# Patient Record
Sex: MALE | Race: WHITE | NOT HISPANIC OR LATINO | Employment: FULL TIME | URBAN - METROPOLITAN AREA
[De-identification: names, ages, dates, MRNs, and addresses within clinical notes are randomized per-mention and may not be internally consistent; named-entity substitution may affect disease eponyms.]

---

## 2019-06-15 ENCOUNTER — OFFICE VISIT (OUTPATIENT)
Dept: URGENT CARE | Facility: CLINIC | Age: 44
End: 2019-06-15
Payer: COMMERCIAL

## 2019-06-15 VITALS
OXYGEN SATURATION: 98 % | RESPIRATION RATE: 16 BRPM | TEMPERATURE: 98.7 F | HEIGHT: 69 IN | HEART RATE: 72 BPM | WEIGHT: 174 LBS | DIASTOLIC BLOOD PRESSURE: 78 MMHG | BODY MASS INDEX: 25.77 KG/M2 | SYSTOLIC BLOOD PRESSURE: 136 MMHG

## 2019-06-15 DIAGNOSIS — Z23 NEED FOR VACCINATION: ICD-10-CM

## 2019-06-15 DIAGNOSIS — S61.219A LACERATION WITHOUT FOREIGN BODY OF UNSPECIFIED FINGER WITHOUT DAMAGE TO NAIL, INITIAL ENCOUNTER: Primary | ICD-10-CM

## 2019-06-15 PROCEDURE — 12001 RPR S/N/AX/GEN/TRNK 2.5CM/<: CPT | Performed by: FAMILY MEDICINE

## 2019-06-15 PROCEDURE — 90715 TDAP VACCINE 7 YRS/> IM: CPT

## 2019-06-15 PROCEDURE — 99204 OFFICE O/P NEW MOD 45 MIN: CPT | Performed by: FAMILY MEDICINE

## 2019-06-15 RX ORDER — FLUTICASONE PROPIONATE 50 MCG
1 SPRAY, SUSPENSION (ML) NASAL DAILY
COMMUNITY

## 2020-09-21 ENCOUNTER — NURSE TRIAGE (OUTPATIENT)
Dept: OTHER | Facility: OTHER | Age: 45
End: 2020-09-21

## 2020-09-21 DIAGNOSIS — Z11.59 ENCOUNTER FOR SCREENING FOR OTHER VIRAL DISEASES: Primary | ICD-10-CM

## 2020-09-21 DIAGNOSIS — Z11.59 ENCOUNTER FOR SCREENING FOR OTHER VIRAL DISEASES: ICD-10-CM

## 2020-09-21 PROCEDURE — U0003 INFECTIOUS AGENT DETECTION BY NUCLEIC ACID (DNA OR RNA); SEVERE ACUTE RESPIRATORY SYNDROME CORONAVIRUS 2 (SARS-COV-2) (CORONAVIRUS DISEASE [COVID-19]), AMPLIFIED PROBE TECHNIQUE, MAKING USE OF HIGH THROUGHPUT TECHNOLOGIES AS DESCRIBED BY CMS-2020-01-R: HCPCS | Performed by: FAMILY MEDICINE

## 2020-09-21 NOTE — TELEPHONE ENCOUNTER
Regarding: COVID TEST NO SYMPTOMS 2 OF 3  ----- Message from Fariha Box sent at 9/21/2020  8:46 AM EDT -----  " My  was exposed to covid"

## 2020-09-21 NOTE — TELEPHONE ENCOUNTER
Reason for Disposition   [1] COVID-19 EXPOSURE (Close Contact) AND [2] within last 14 days BUT [2] NO symptoms    Answer Assessment - Initial Assessment Questions  1  CLOSE CONTACT: "Who is the person with the confirmed or suspected COVID-19 infection that you were exposed to?"      Friend of the family     2  PLACE of CONTACT: "Where were you when you were exposed to COVID-19?" (e g , home, school, medical waiting room; which city?)      Out door party     3  TYPE of CONTACT: "How much contact was there?" (e g , sitting next to, live in same house, work in same office, same building)      In the same house and sitting next to     4  DURATION of CONTACT: "How long were you in contact with the COVID-19 patient?" (e g , a few seconds, passed by person, a few minutes, live with the patient)      1 day     5  DATE of CONTACT: "When did you have contact with a COVID-19 patient?" (e g , how many days ago)      9/12/2020    6  TRAVEL: "Have you traveled out of the country recently?" If so, "When and where?"      * Also ask about out-of-state travel, since the CDC has identified some high risk cities for community spread in the [address] [place] [address] [address]  * Note: Travel becomes less relevant if there is widespread community transmission where the patient lives  Denies     7  COMMUNITY SPREAD: "Are there lots of cases of COVID-19 (community spread) where you live?" (See public health department website, if unsure)    * MAJOR community spread: high number of cases; numbers of cases are increasing; many people hospitalized  * MINOR community spread: low number of cases; not increasing; few or no people hospitalized      [facility]     8  SYMPTOMS: "Do you have any symptoms?" (e g , fever, cough, breathing difficulty)      Denies      10  HIGH RISK: "Do you have any heart or lung problems?  Do you have a weak immune system?" (e g , CHF, COPD, asthma, HIV positive, chemotherapy, renal failure, diabetes mellitus, sickle cell anemia) Denies    Protocols used: CORONAVIRUS (COVID-19) EXPOSURE-ADULT-[place]

## 2020-09-23 LAB — SARS-COV-2 RNA SPEC QL NAA+PROBE: NOT DETECTED

## 2021-04-13 ENCOUNTER — OFFICE VISIT (OUTPATIENT)
Dept: URGENT CARE | Facility: CLINIC | Age: 46
End: 2021-04-13
Payer: COMMERCIAL

## 2021-04-13 VITALS
OXYGEN SATURATION: 100 % | RESPIRATION RATE: 16 BRPM | BODY MASS INDEX: 23.91 KG/M2 | HEART RATE: 68 BPM | HEIGHT: 70 IN | WEIGHT: 167 LBS | TEMPERATURE: 98.4 F

## 2021-04-13 DIAGNOSIS — J06.9 VIRAL URI: Primary | ICD-10-CM

## 2021-04-13 LAB — S PYO AG THROAT QL: NEGATIVE

## 2021-04-13 PROCEDURE — 87880 STREP A ASSAY W/OPTIC: CPT | Performed by: PHYSICIAN ASSISTANT

## 2021-04-13 PROCEDURE — U0005 INFEC AGEN DETEC AMPLI PROBE: HCPCS | Performed by: PHYSICIAN ASSISTANT

## 2021-04-13 PROCEDURE — 99213 OFFICE O/P EST LOW 20 MIN: CPT | Performed by: PHYSICIAN ASSISTANT

## 2021-04-13 PROCEDURE — U0003 INFECTIOUS AGENT DETECTION BY NUCLEIC ACID (DNA OR RNA); SEVERE ACUTE RESPIRATORY SYNDROME CORONAVIRUS 2 (SARS-COV-2) (CORONAVIRUS DISEASE [COVID-19]), AMPLIFIED PROBE TECHNIQUE, MAKING USE OF HIGH THROUGHPUT TECHNOLOGIES AS DESCRIBED BY CMS-2020-01-R: HCPCS | Performed by: PHYSICIAN ASSISTANT

## 2021-04-13 RX ORDER — PREDNISONE 10 MG/1
10 TABLET ORAL DAILY
Qty: 21 TABLET | Refills: 0 | Status: SHIPPED | OUTPATIENT
Start: 2021-04-13

## 2021-04-13 NOTE — PROGRESS NOTES
[facility]        NAME: Delfin Mcmahon is a 41 y o  male  : 1975    MRN: [id number]  DATE: 2021  TIME: 9:23 AM    Assessment and Plan   Viral URI [J06 9]  1  Viral URI  POCT rapid strepA    predniSONE 10 mg tablet    Novel Coronavirus (Covid-19),PCR [facility] - Office Collection       Rapid strep negative  Positive for lack of cough  Negative for fever, exudates, and lymphadenopathy  Patient Instructions     Prednisone as prescribed  Flonase and Zyrtec daily  Discussed possibility of GERD  Covid 19 results will return in a 3-5 days  We will call you with both negative or positive results  Prophylactically self quarantine  Department of health's newest recommendations state patient should self quarantine for 10 days since symptom onset or 24 hours fever free without the use of fever reducing drugs (Tylenol and ibuprofen), whichever is longer AND overall improvement of symptoms  Drink lots of fluids to maintain hydration  Do not touch your face, wash hands often, and practice social distancing  Call your family doctor to have a follow-up appointment in next few days  Go to ER if he began experiencing chest pain, shortness of breath, fever that is not responding to antipyretics or other severe symptoms  Follow up with PCP in 3-5 days  Proceed to  ER if symptoms worsen  Chief Complaint     Chief Complaint   Patient presents with    Sore Throat     sore throat and right earache x 4 days         History of Present Illness       Patient is a 60-year-old male with significant past medical history of seaonal allergies presents the office complaining of sore throat and right otalgia for 4 days  Patient reports he has some trouble swallowing as a feels like his mucus gets stuck  Symptoms are worse 1st thing in the morning and at night then improve throughout the day   Patient also reports chronic throat clearing and history of GERD but denies fevers, congestion, change in taste or smell, chest pain, shortness of breath, or abdominal pain  Denies exposure to COVID-19  Patient called his PCP who stated they would not see him due to his symptoms  He reports taking Flonase as needed but has not tried this  He has been taking DayQuil and NyQuil with temporary relief of symptoms  Review of Systems   Review of Systems   Constitutional: Negative for chills and fever  HENT: Positive for ear pain, postnasal drip and sore throat  Negative for congestion  Respiratory: Negative for cough and shortness of breath  Cardiovascular: Negative for chest pain and palpitations  Gastrointestinal: Negative for abdominal pain, diarrhea, nausea and vomiting  Musculoskeletal: Negative for myalgias  Neurological: Negative for dizziness, light-headedness and headaches  Current Medications       Current Outpatient Medications:     fluticasone (FLONASE) 50 mcg/act nasal spray, 1 spray into each nostril daily, Disp: , Rfl:     predniSONE 10 mg tablet, Take 1 tablet (10 mg total) by mouth daily Take 6 on day 1, take 5 on day 2, take 4 on day 3, take 3 on day 4, take 2 on day 5, take 1 on day 6 , Disp: 21 tablet, Rfl: 0    Current Allergies     Allergies as of 04/13/2021    (No Known Allergies)            The following portions of the patient's history were reviewed and updated as appropriate: allergies, current medications, past family history, past medical history, past social history, past surgical history and problem list      Past Medical History:   Diagnosis Date    Allergic     Lyme disease        Past Surgical History:   Procedure Laterality Date    NO PAST SURGERIES      NO PAST SURGERIES         Family History   Problem Relation Age of Onset    No Known Problems Mother     No Known Problems Father          Medications have been verified          Objective   Pulse 68   Temp 98 4 °F (36 9 °C)   Resp 16   Ht 5' 10" (1 778 m)   Wt 75 8 kg (167 lb)   SpO2 100%   BMI 23 96 kg/m²   No LMP for male patient  Physical Exam     Physical Exam  Vitals signs and nursing note reviewed  Constitutional:       General: He is not in acute distress  Appearance: Normal appearance  He is well-developed  He is not ill-appearing  HENT:      Head: Normocephalic and atraumatic  Right Ear: Tympanic membrane, ear canal and external ear normal       Left Ear: Tympanic membrane, ear canal and external ear normal       Nose: Nose normal       Mouth/Throat:      Mouth: Mucous membranes are moist       Pharynx: Uvula midline  Pharyngeal swelling and posterior oropharyngeal erythema present  Tonsils: No tonsillar exudate or tonsillar abscesses  Eyes:      General: Lids are normal       Conjunctiva/sclera: Conjunctivae normal       Pupils: Pupils are equal, round, and reactive to light  Neck:      Musculoskeletal: Neck supple  Cardiovascular:      Rate and Rhythm: Normal rate and regular rhythm  Heart sounds: Normal heart sounds  No murmur  No friction rub  No gallop  Pulmonary:      Effort: Pulmonary effort is normal       Breath sounds: Normal breath sounds  No stridor  No wheezing or rales  Musculoskeletal: Normal range of motion  Skin:     General: Skin is warm and dry  Capillary Refill: Capillary refill takes less than 2 seconds  Neurological:      Mental Status: He is alert  Rapid strep negative

## 2021-04-13 NOTE — PATIENT INSTRUCTIONS
Take prednisone as prescribed  Begin taking Claritin and Flonase daily  Covid 19 results will return in a 3-5 days  We will call you with both negative or positive results  Prophylactically self quarantine  Department of health's newest recommendations state patient should self quarantine for 10 days since symptom onset or 24 hours fever free without the use of fever reducing drugs (Tylenol and ibuprofen), whichever is longer AND overall improvement of symptoms  Drink lots of fluids to maintain hydration  Do not touch your face, wash hands often, and practice social distancing  Call your family doctor to have a follow-up appointment in next few days  Go to ER if he began experiencing chest pain, shortness of breath, fever that is not responding to antipyretics or other severe symptoms  COVID-19 (Coronavirus Disease 2019)   WHAT YOU NEED TO KNOW:   COVID-19 is the disease caused by the novel (new) coronavirus first discovered in December 2019  Coronaviruses generally cause upper respiratory (nose, throat, and lung) infections, such as a cold  The new virus can also cause serious lower respiratory conditions, such as pneumonia or acute respiratory distress syndrome (ARDS)  Anyone can develop serious problems from the new virus, but your risk is higher if you are 65 or older  A weak immune system, diabetes, or a heart or lung condition can also increase your risk  DISCHARGE INSTRUCTIONS:   If you think you or someone you know may be infected:  Do the following to protect others:  · If emergency care is needed,  tell the  about the possible infection, or call ahead and tell the emergency department  · Call a healthcare provider  for instructions if symptoms are mild  Anyone who may be infected should not  arrive without calling first  The provider will need to protect staff members and other patients  · The person who may be infected needs to wear a face covering  while getting medical care  This will help lower the risk of infecting others  Coverings are not used for anyone who is younger than 2 years, has breathing problems, or cannot remove it  The provider can give you instructions for anyone who cannot wear a covering  Call your local emergency number (911 in the [address]) or go to the emergency department if:   · You have trouble breathing or shortness of breath at rest     · You have chest pain or pressure that lasts longer than 5 minutes  · You become confused or hard to wake  · Your lips or face are blue  · You have a fever of 104°F (40°C) or higher  Call your doctor if:   · You do not  have symptoms of COVID-19 but had close physical contact within 14 days with someone who tested positive  · You have questions or concerns about your condition or care  Medicines: You may need any of the following for mild symptoms:  · Decongestants  help reduce nasal congestion and help you breathe more easily  If you take decongestant pills, they may make you feel restless or cause problems with your sleep  Do not use decongestant sprays for more than a few days  · Cough suppressants  help reduce coughing  Ask your healthcare provider which type of cough medicine is best for you  · Acetaminophen  decreases pain and fever  It is available without a doctor's order  Ask how much to take and how often to take it  Follow directions  Read the labels of all other medicines you are using to see if they also contain acetaminophen, or ask your doctor or pharmacist  Acetaminophen can cause liver damage if not taken correctly  Do not use more than 4 grams (4,000 milligrams) total of acetaminophen in one day  · NSAIDs , such as ibuprofen, help decrease swelling, pain, and fever  NSAIDs can cause stomach bleeding or kidney problems in certain people  If you take blood thinner medicine, always ask your healthcare provider if NSAIDs are safe for you   Always read the medicine label and follow directions  · Take your medicine as directed  Contact your healthcare provider if you think your medicine is not helping or if you have side effects  Tell him or her if you are allergic to any medicine  Keep a list of the medicines, vitamins, and herbs you take  Include the amounts, and when and why you take them  Bring the list or the pill bottles to follow-up visits  Carry your medicine list with you in case of an emergency  How the 2019 coronavirus spreads: The virus spreads quickly and easily  You can become infected if you are in contact with a large amount of the virus, even for a short time  You can also become infected by being around a small amount of virus for a long time  The following are ways the virus is thought to spread, but more information may be coming:  · Droplets are the most common way all coronaviruses spread  The virus can travel in droplets that form when a person talks, coughs, or sneezes  Anyone who breathes in the droplets or gets them in his or her eyes can become infected with the virus  Close personal contact with an infected person is thought to be the main way the virus spreads  Close personal contact means you are within 6 feet (2 meters) of the person  · Person-to-person contact can spread the virus  For example, a person with the virus on his or her hands can spread it by shaking hands with someone  At this time, it does not appear that the virus can be passed to a baby during pregnancy or delivery  The baby can be infected after he or she is born through person-to-person contact  The virus also does not appear to spread in breast milk  If you are pregnant or breastfeeding, talk to your healthcare provider or obstetrician about any concerns you have  · The virus can stay on objects and surfaces  A person can get the virus on his or her hands by touching the object or surface   Infection happens if the person then touches his or her eyes or mouth with unwashed hands  It is not yet known how long the virus can stay on an object or surface  That is why it is important to clean all surfaces that are used regularly  · An infected animal may be able to infect a person who touches it  This may happen at live markets or on a farm  How everyone can lower the risk for COVID-19:  The best way to prevent infection is to avoid anyone who is infected, but this can be hard to do  An infected person can spread the virus before signs or symptoms begin, or even if signs or symptoms never develop  The following can help lower the risk for infection:      · Wash your hands often throughout the day  Use soap and water  Rub your soapy hands together, lacing your fingers  Wash the front and back of each hand, and in between your fingers  Use the fingers of one hand to scrub under the fingernails of the other hand  Wash for at least 20 seconds  Rinse with warm, running water for several seconds  Then dry your hands with a clean towel or paper towel  Use hand  that contains alcohol if soap and water are not available  Do not touch your eyes, nose, or mouth without washing your hands first  Teach children how to wash their hands and use hand   · Cover a sneeze or cough  This prevents droplets from traveling from you to others  Turn your face away and cover your mouth and nose with a tissue  Throw the tissue away  Use the bend of your arm if a tissue is not available  Then wash your hands well with soap and water or use hand   Turn and cover your face if you are around someone who is sneezing or coughing  Teach children how to cover a cough or sneeze  · Follow worldwide, national, and local social distancing guidelines  Social distancing means people avoid close physical contact so the virus cannot spread from one person to another  Keep at least 6 feet (2 meters) between you and others   Also keep this distance from anyone who comes to your home, such as someone making a delivery  · Make a habit of not touching your face  It is not known how long the virus can stay on objects and surfaces  If you get the virus on your hands, you can transfer it to your eyes, nose, or mouth and become infected  You can also transfer it to objects, surfaces, or people  Be aware of what you touch when you go out  Examples include handrails and elevator buttons  Try not to touch anything with bare hands unless it is necessary  Wash your hands before you leave your home and when you return  · Clean and disinfect high-touch surfaces and objects often  Use a disinfecting solution or wipes  You can make a solution by diluting 4 teaspoons of bleach in 1 quart (4 cups) of water  Clean and disinfect even if you think no one living in or coming to your home is infected with the virus  You can wipe items with a disinfecting cloth before you bring them into your home  Wash your hands after you handle anything you bring into your home  · Make your immune system as healthy as possible  A weakened immune system makes you more vulnerable to the new coronavirus  No COVID-19 vaccine is available yet  Vaccines such as the flu and pneumonia vaccines can help your immune system  Your healthcare provider can tell you which vaccines to get, and when to get them  Keep your immune system as strong as possible  Do not smoke  Eat healthy foods, exercise regularly, and try to manage stress  Go to bed and wake up at the same times each day  Social distancing guidelines:  National and local social distancing rules vary  Rules may change over time as restrictions are lifted  Restrictions may return if an outbreak happens where you live  It is important to know and follow all current social distancing rules in your area  The following are general guidelines:  · Limit trips out of your home  You may be able to have food, medicines, and other supplies delivered   If possible, have delivered items left at your door or other area  Try not to have someone hand you an item  You will be so close to the person that the virus can spread between you  · Do not have close physical contact with anyone who does not live in your home  Do not shake hands with, hug, or kiss a person as a greeting  Stand or walk as far from others as possible  If you must use public transportation (such as a bus or subway), try to sit or stand away from others  You can stay safely connected with others through phone calls, e-mail messages, social media websites, and video chats  Check in on anyone who may be having a hard time socially distancing, or who lives alone  Ask administrators at nursing homes or long-term care facilities how you can safely communicate with someone living there  · Wear a cloth face covering around others who do not live in your home  Face coverings help prevent the virus from spreading to others in droplets  You can use a clear face covering if someone needs to read your lips  This is a cloth covering that has plastic over the mouth area so your lips can be seen  Do not use coverings that have breathing valves or vents  The virus can travel out of the valve or vent and be spread to others  Do not take your covering off to talk, cough, or sneeze  Do not use coverings on children younger than 2 years or on anyone who has breathing problems or cannot remove it  · Only allow medical or other necessary professionals into your home  Wear your face covering, and remind professionals to wear a face covering  Remind them to wash their hands when they arrive and before they leave  Do not  let anyone who does not live in your home in, even if the person is not sick  A person can pass the virus to others before symptoms of COVID-19 begin  Some people never even develop symptoms  Children commonly have mild symptoms or no symptoms  It may be hard to tell a child not to hug or kiss you   Explain that this is how he or she can help you stay healthy  · Do not go to someone else's home unless it is necessary  Do not go over to visit, even if the person is lonely  Only go if you need to help him or her  Make sure you both wear face coverings while you are there  · Avoid large gatherings and crowds  Gatherings or crowds of 10 or more individuals can cause the virus to spread  Examples of gatherings include parties, sporting events, Orthodox services, and conferences  Crowds may form at beaches, miller, and tourist attractions  Protect yourself by staying away from large gatherings and crowds  · Ask your healthcare provider for other ways to have appointments  You may be able to have appointments without having to go into the provider's office  Some providers offer phone, video, or other types of appointments  You may also be able to get prescriptions for a few months of your medicines at a time  · Stay safe if you must go out to work  You may have a job that can only be done outside your home  Keep physical distance between you and other workers as much as possible  Follow your employer's rules so everyone stays safe  If you have COVID-19 and are recovering at home:  Healthcare providers will give you specific instructions to follow  The following are general guidelines to remind you how to keep others safe until you are well:  · Wash your hands often  Use soap and water as much as possible  You can use hand  that contains alcohol if soap and water are not available  Do not share towels with anyone  If you use paper towels, throw them away in a lined trash can kept in your room or area  Use a covered trash can, if possible  · Do not go out of your home unless it is necessary  You may have to go to your healthcare provider's office for check-ups or to get prescription refills  Do not arrive at the provider's office without an appointment   Providers have to make their offices safe for staff and other patients  · Do not have close physical contact with anyone unless it is necessary  Only have close physical contact with a person giving direct care, or a baby or child you must care for  Family members and friends should not visit you  If possible, stay in a separate area or room of your home if you live with others  No one should go into the area or room except to give you care  You can visit with others by phone, video chat, e-mail, or similar systems  It is important to stay connected with others in your life while you recover  · Wear a face covering while others are near you  This can help prevent droplets from spreading the virus when you talk, sneeze, or cough  Put the covering on before anyone comes into your room or area  Remind the person to cover his or her nose and mouth before going in to provide care for you  · Do not share items  Do not share dishes, towels, or other items with anyone  Items need to be washed after you use them  · Protect your baby  Wash your hands with soap and water often throughout the day  Wear a clean face covering while you breastfeed, or while you express or pump breast milk  If possible, ask someone who is well to care for your baby  You can put breast milk in bottles for the person to use, if needed  Talk to your healthcare provider if you have any questions or concerns about caring for or bonding with your baby  He or she will tell you when to bring your baby in for check-ups and vaccines  He or she will also tell you what to do if you think your baby was infected with the new virus  · Do not handle live animals  Until more is known, it is best not to touch, play with, or handle live animals  Some animals, including pets, have been infected with the new coronavirus  Do not handle or care for animals until you are well  Care includes feeding, petting, and cuddling your pet  Do not let your pet lick you or share your food   Ask someone who is not infected to take care of your pet, if possible  If you must care for a pet, wear a face covering  Wash your hands before and after you give care  · Follow directions from your healthcare provider for being around others after you recover  You will need to wait at least 10 days after symptoms first appeared  Then you will need to have no fever for 24 hours without fever medicine, and no other symptoms  A loss of taste or smell may continue for several months  It is considered okay to be around others if this is your only symptom  It is not known for sure if or for how long a recovered person can pass the virus to others  Your provider may give you instructions, such as continuing social distancing or wearing a face covering around others  How to take care of someone who has COVID-19:  If the person lives in another home, arrange for a time to give care  Remember to bring a few pairs of disposable gloves and a cloth face covering  The following are general guidelines to help you safely care for anyone who has COVID-19:  · Wash your hands often  Wash before and after you go into the person's home, area, or room  Throw paper towels away in a lined trash can that has a lid, if possible  · Do not allow others to go near the person  No one should come into the person's home unless it is necessary  If possible, the person should be in a separate area or room if he or she lives with others  Keep the room's door shut unless you need to go in or out  Have others call, video chat, or e-mail the person if he or she is feeling well enough  The person may feel lonely if he or she is kept separate for a long period of time  Safe communication can help him or her stay connected to family and friends  · Make sure the person's room has good air flow  You may be able to open the window if the weather allows  An air conditioner can also be turned on to help air move  · Contact the person before you go in to give care  Make sure the person is wearing a face covering  Remind him or her to wash his or her hands with soap and water  He or she can use hand  that contains alcohol if soap and water are not available  Put on a face covering before you go in to give care  · Wear gloves while you give care and clean  Clean items the person uses often  Clean countertops, cooking surfaces, and the fronts and insides of the microwave and refrigerator  Clean the shower, toilet, the area around the toilet, the sink, the area around the sink, and faucets  Gather used laundry or bedding  Wash and dry items on the warmest settings the fabric allows  Wash dishes and silverware in hot, soapy water or in a   · Anything you throw away needs to go into a lined trash can  When you need to empty the trash, close the open end of the lining and tie it closed  This helps prevent items the virus is on from spilling out of the trash  Remove your gloves and throw them away  Wash your hands  Follow up with your doctor as directed:  Write down your questions so you remember to ask them during your visits  For more information:   · Centers for Disease Control and Prevention  [address] , [address]  Phone: [phone] - [phone]  Web Address: [employer] com br    © Copyright [address] [place] [address] Drive Information is for End User's use only and may not be sold, redistributed or otherwise used for commercial purposes  All illustrations and images included in CareNotes® are the copyrighted property of A D A M [employer]  or [employer]  The above information is an  only  It is not intended as medical advice for individual conditions or treatments  Talk to your doctor, nurse or pharmacist before following any medical regimen to see if it is safe and effective for you

## 2021-04-13 NOTE — LETTER
April 13, 2021     Patient: Fernandez Mclean   YOB: 1975   Date of Visit: 4/13/2021       To Whom It May Concern: It is my medical opinion that Fernandez Mclean should remain out of work for 10 days since symptom onset or 24 hours fever free without the use of fever reducing drugs, whichever is longer AND overall general improvement in symptoms OR 10 days since last exposure OR negative results  If you have any questions or concerns, please don't hesitate to call           Sincerely,        Gaudencio Peng PA-C    CC: No Recipients

## 2021-04-14 LAB — SARS-COV-2 RNA RESP QL NAA+PROBE: NEGATIVE

## 2021-04-15 ENCOUNTER — TELEPHONE (OUTPATIENT)
Dept: URGENT CARE | Facility: CLINIC | Age: 46
End: 2021-04-15

## 2022-04-25 ENCOUNTER — OFFICE VISIT (OUTPATIENT)
Dept: URGENT CARE | Facility: CLINIC | Age: 47
End: 2022-04-25
Payer: COMMERCIAL

## 2022-04-25 VITALS
SYSTOLIC BLOOD PRESSURE: 140 MMHG | BODY MASS INDEX: 24.73 KG/M2 | OXYGEN SATURATION: 100 % | TEMPERATURE: 97.4 F | HEART RATE: 74 BPM | WEIGHT: 167 LBS | HEIGHT: 69 IN | DIASTOLIC BLOOD PRESSURE: 82 MMHG | RESPIRATION RATE: 20 BRPM

## 2022-04-25 DIAGNOSIS — M77.11 LATERAL EPICONDYLITIS OF RIGHT ELBOW: Primary | ICD-10-CM

## 2022-04-25 PROCEDURE — 99213 OFFICE O/P EST LOW 20 MIN: CPT

## 2022-04-25 PROCEDURE — 20605 DRAIN/INJ JOINT/BURSA W/O US: CPT

## 2022-04-25 RX ORDER — DEXAMETHASONE SODIUM PHOSPHATE 4 MG/ML
4 INJECTION, SOLUTION INTRA-ARTICULAR; INTRALESIONAL; INTRAMUSCULAR; INTRAVENOUS; SOFT TISSUE ONCE
Status: COMPLETED | OUTPATIENT
Start: 2022-04-25 | End: 2022-04-25

## 2022-04-25 RX ORDER — LIDOCAINE HYDROCHLORIDE AND EPINEPHRINE 10; 10 MG/ML; UG/ML
1 INJECTION, SOLUTION INFILTRATION; PERINEURAL ONCE
Status: COMPLETED | OUTPATIENT
Start: 2022-04-25 | End: 2022-04-25

## 2022-04-25 RX ORDER — OMEPRAZOLE 20 MG/1
20 CAPSULE, DELAYED RELEASE ORAL DAILY
COMMUNITY

## 2022-04-25 RX ADMIN — DEXAMETHASONE SODIUM PHOSPHATE 4 MG: 4 INJECTION, SOLUTION INTRA-ARTICULAR; INTRALESIONAL; INTRAMUSCULAR; INTRAVENOUS; SOFT TISSUE at 14:09

## 2022-04-25 RX ADMIN — LIDOCAINE HYDROCHLORIDE AND EPINEPHRINE 1 ML: 10; 10 INJECTION, SOLUTION INFILTRATION; PERINEURAL at 14:09

## 2022-04-25 NOTE — PATIENT INSTRUCTIONS
You received a joint injection today  Continue to use the brace you have been using  Ice to affected area 3-4 times a day for 20-30 minutes at a time  You may use Tylenol for additional pain control  Do not take other anti-inflammatory medications  Follow up with your PCP  Go to ED for worsening symptoms  Tennis Elbow   WHAT YOU NEED TO KNOW:   Tennis elbow is inflammation of the tendons in your elbow  Tendons are strong tissues that connect muscle to bone  DISCHARGE INSTRUCTIONS:   Return to the emergency department if:   · You suddenly have no feeling in your arm, hand, or fingers  · You suddenly cannot move your arm, wrist, hand, or fingers  Contact your healthcare provider if:   · You have a fever  · You have more pain or weakness in your arm, wrist, hand, or fingers  · You have new numbness or tingling in your arm, hand, or fingers  · You have questions or concerns about your condition or care  Medicines:   · Acetaminophen  decreases pain and fever  It is available without a doctor's order  Ask how much to take and how often to take it  Follow directions  Read the labels of all other medicines you are using to see if they also contain acetaminophen, or ask your doctor or pharmacist  Acetaminophen can cause liver damage if not taken correctly  Do not use more than 4 grams (4,000 milligrams) total of acetaminophen in one day  · NSAIDs , such as ibuprofen, help decrease swelling, pain, and fever  This medicine is available with or without a doctor's order  NSAIDs can cause stomach bleeding or kidney problems in certain people  If you take blood thinner medicine, always ask your healthcare provider if NSAIDs are safe for you  Always read the medicine label and follow directions  · Take your medicine as directed  Contact your healthcare provider if you think your medicine is not helping or if you have side effects  Tell him or her if you are allergic to any medicine   Keep a list of the medicines, vitamins, and herbs you take  Include the amounts, and when and why you take them  Bring the list or the pill bottles to follow-up visits  Carry your medicine list with you in case of an emergency  Physical therapy:  A physical therapist teaches you exercises to help improve movement and strength, and to decrease pain  Self-care:   · Wear your support device as directed  Devices, such as an arm strap, brace, or splint, help limit your arm movement  They also decrease pain and help prevent more damage to your tendon  Ask your healthcare provider how to care for your arm while you wear a brace or splint  · Rest your injured arm  and avoid activities that cause pain  This will help your tendons heal     · Apply ice on your elbow  for 15 to 20 minutes every hour or as directed  Use an ice pack, or put crushed ice in a plastic bag  Cover it with a towel before you apply it to your skin  Ice helps prevent tissue damage and decreases swelling and pain  · Elevate your elbow  above the level of your heart as often as you can  This will help decrease swelling and pain  Prop your elbow on pillows or blankets to keep it elevated comfortably  Follow up with your doctor as directed:  Write down your questions so you remember to ask them during your visits  © Copyright [facility] 2022 Information is for End User's use only and may not be sold, redistributed or otherwise used for commercial purposes  All illustrations and images included in CareNotes® are the copyrighted property of A D [employer] M , Inc  or Matilde Garay   The above information is an  only  It is not intended as medical advice for individual conditions or treatments  Talk to your doctor, nurse or pharmacist before following any medical regimen to see if it is safe and effective for you

## 2022-04-25 NOTE — LETTER
April 25, 2022     Patient: Brittney Gonzalez   YOB: 1975   Date of Visit: 4/25/2022       To Whom It May Concern: It is my medical opinion that Brittney Gonzalez may return to work on 4/26/2022  If you have any questions or concerns, please don't hesitate to call           Sincerely,        EMELYN Han    CC: No Recipients

## 2022-04-25 NOTE — LETTER
April 25, 2022     Patient: Osmel Mello   YOB: 1975   Date of Visit: 4/25/2022       To Whom It May Concern: It is my medical opinion that Osmel Mello may return to light duty immediately with the following restrictions: no repative motions of the right elbow for 2 weeks starting 4/25/2022  If you have any questions or concerns, please don't hesitate to call           Sincerely,        EMELYN Blanton    CC: No Recipients

## 2022-04-25 NOTE — PROGRESS NOTES
[facility]        NAME: Shivam Colon is a 40 y o  male  : 1975    MRN: [id number]  DATE: 2022  TIME: 3:23 PM    Assessment and Plan   Lateral epicondylitis of right elbow [M77 11]  1  Lateral epicondylitis of right elbow  dexamethasone (DECADRON) injection 4 mg    lidocaine-epinephrine (XYLOCAINE/EPINEPHRINE) 1 %-1:100,000 injection 1 mL    Ambulatory Referral to Physical Therapy    Medium joint arthrocentesis: R elbow         Patient Instructions     You received a joint injection today  Continue to use the brace you have been using  Ice to affected area 3-4 times a day for 20-30 minutes at a time  You may use Tylenol for additional pain control  Do not take other anti-inflammatory medications  Follow up with your PCP  Go to ED for worsening symptoms  Chief Complaint     Chief Complaint   Patient presents with    Elbow Pain     Patient states he has been having pain in the R elbow when he bumps it and laterally rotate  He has had tennis elbow inthe past         History of Present Illness       Patient complaining of pain to the right elbow that started approximately 1 month ago  He reports a history of tennis elbow and reports it feels the same  He reports repetitive movement of the right arm throughout the day at work  He reports this repetitive movement has been constant for about 1-2 months  He reports using a tennis elbow brace with minimal improvement  He denies trying other alleviating factors  He reports significant PMH of GERD and Lyme disease  Review of Systems   Review of Systems   Constitutional: Negative for chills, fatigue and fever  Respiratory: Negative for cough and shortness of breath  Cardiovascular: Negative for chest pain  Musculoskeletal: Positive for arthralgias  All other systems reviewed and are negative          Current Medications       Current Outpatient Medications:     omeprazole (PriLOSEC) 20 mg delayed release capsule, Take 20 mg by mouth daily, Disp: , Rfl:     fluticasone (FLONASE) 50 mcg/act nasal spray, 1 spray into each nostril daily (Patient not taking: Reported on 4/25/2022 ), Disp: , Rfl:     predniSONE 10 mg tablet, Take 1 tablet (10 mg total) by mouth daily Take 6 on day 1, take 5 on day 2, take 4 on day 3, take 3 on day 4, take 2 on day 5, take 1 on day 6  (Patient not taking: Reported on 4/25/2022 ), Disp: 21 tablet, Rfl: 0  No current facility-administered medications for this visit  Current Allergies     Allergies as of 04/25/2022    (No Known Allergies)            The following portions of the patient's history were reviewed and updated as appropriate: allergies, current medications, past family history, past medical history, past social history, past surgical history and problem list      Past Medical History:   Diagnosis Date    Allergic     GERD (gastroesophageal reflux disease)     Lyme disease        Past Surgical History:   Procedure Laterality Date    NO PAST SURGERIES      NO PAST SURGERIES         Family History   Problem Relation Age of Onset    No Known Problems Mother     No Known Problems Father          Medications have been verified  Objective   /82   Pulse 74   Temp (!) 97 4 °F (36 3 °C)   Resp 20   Ht 5' 9" (1 753 m)   Wt 75 8 kg (167 lb)   SpO2 100%   BMI 24 66 kg/m²        Physical Exam     Physical Exam  Vitals and nursing note reviewed  Constitutional:       General: He is not in acute distress  Appearance: Normal appearance  He is not toxic-appearing  HENT:      Head: Normocephalic and atraumatic  Right Ear: External ear normal       Left Ear: External ear normal       Nose: Nose normal       Mouth/Throat:      Mouth: Mucous membranes are moist       Pharynx: Oropharynx is clear  Eyes:      General: No scleral icterus  Right eye: No discharge  Left eye: No discharge        Conjunctiva/sclera: Conjunctivae normal    Cardiovascular: Rate and Rhythm: Normal rate  Pulmonary:      Effort: Pulmonary effort is normal    Musculoskeletal:         General: Normal range of motion  Right elbow: Tenderness present in lateral epicondyle  No radial head, medial epicondyle or olecranon process tenderness  Left elbow: Normal       Cervical back: Normal range of motion  Skin:     General: Skin is warm and dry  Neurological:      General: No focal deficit present  Mental Status: He is alert and oriented to person, place, and time  Psychiatric:         Mood and Affect: Mood normal          Behavior: Behavior normal      Medium joint arthrocentesis: R elbow  Universal Protocol:  Procedure performed by: (Dr Christal Mcacnn)  Consent: Verbal consent obtained  Written consent obtained  Risks and benefits: risks, benefits and alternatives were discussed  Consent given by: patient  Time out: Immediately prior to procedure a "time out" was called to verify the correct patient, procedure, equipment, support staff and site/side marked as required  Timeout called at: 4/25/2022 3:18 PM   Patient understanding: patient states understanding of the procedure being performed  Patient consent: the patient's understanding of the procedure matches consent given  Procedure consent: procedure consent matches procedure scheduled  Relevant documents: relevant documents present and verified  Patient identity confirmed: verbally with patient    Supporting Documentation  Indications: pain   Procedure Details  Location: elbow (lateral epicodyle ) - R elbow  Needle size: 27 G  Ultrasound guidance: no  Approach: anterolateral  Medication group details: Dexamethasone 4 mg and 1 mL of lidocaine 1% with epi      Patient tolerance: patient tolerated the procedure well with no immediate complications

## 2022-07-29 ENCOUNTER — OFFICE VISIT (OUTPATIENT)
Dept: URGENT CARE | Facility: CLINIC | Age: 47
End: 2022-07-29
Payer: COMMERCIAL

## 2022-07-29 VITALS
BODY MASS INDEX: 24.73 KG/M2 | HEART RATE: 78 BPM | SYSTOLIC BLOOD PRESSURE: 163 MMHG | HEIGHT: 69 IN | DIASTOLIC BLOOD PRESSURE: 88 MMHG | RESPIRATION RATE: 16 BRPM | OXYGEN SATURATION: 99 % | TEMPERATURE: 98 F | WEIGHT: 167 LBS

## 2022-07-29 DIAGNOSIS — R20.2 PARESTHESIA OF LEFT LOWER EXTREMITY: Primary | ICD-10-CM

## 2022-07-29 PROCEDURE — 99213 OFFICE O/P EST LOW 20 MIN: CPT | Performed by: EMERGENCY MEDICINE

## 2022-07-29 RX ORDER — LORATADINE 10 MG/1
10 TABLET ORAL DAILY
COMMUNITY

## 2022-07-29 RX ORDER — PREDNISONE 10 MG/1
TABLET ORAL
Qty: 27 TABLET | Refills: 0 | Status: SHIPPED | OUTPATIENT
Start: 2022-07-29

## 2022-07-29 NOTE — PATIENT INSTRUCTIONS
Use heat 10 - 15 minutes every 2 - 3 hrs especially before stretching, but may use ice for reducing inflammation  Hold any NSAID's like Ibuprofen (Advil), Naprosyn (Aleve), etc while on steroids like Medrol or Prednisone  If you are diabetic you should adhere strictly to your diabetic diet and monitor blood sugar closely while on prednisone and you should discontinue the prednisone if blood sugar becomes significantly elevated  Paresthesia   WHAT YOU NEED TO KNOW:   Paresthesia is numbness, tingling, or burning  It can happen in any part of your body, but usually occurs in your legs, feet, arms, or hands  DISCHARGE INSTRUCTIONS:   Return to the emergency department if:   You have severe pain along with numbness and tingling  Your legs suddenly become cold  You have trouble moving your legs, and they ache  You have increased weakness in a part of your body  You have uncontrolled movements  Contact your healthcare provider or neurologist if:   Your symptoms do not improve  You have symptoms in more than one part of your body  You have questions or concerns about your condition or care  Manage paresthesia:   Protect the area from injury  You may injure or burn yourself if you lose feeling in the area  Be careful when you touch anything that could be hot  Wear sturdy shoes to protect your feet  Ask about other ways to protect yourself  Go to physical or occupational therapy if directed  Your provider may recommend therapy if you have a condition such as carpal tunnel syndrome  A physical therapist can teach you exercises to help strengthen the area or increase your ability to move it  An occupational therapist can help you find new ways to do your daily activities  Manage health conditions that can cause paresthesia  Work with your diabetes specialist if you have uncontrolled diabetes   A dietitian or your healthcare provider can help you create a meal plan if you have low vitamin B levels  Your provider can help you manage your health if you have multiple sclerosis or you had a stroke  It is important to manage health conditions to stop paresthesia or prevent it from getting worse  Follow up with your healthcare provider or neurologist as directed: Your healthcare provider may refer you to a specialist  Write down your questions so you remember to ask them during your visits  © Copyright [employer] 2022 Information is for End User's use only and may not be sold, redistributed or otherwise used for commercial purposes  All illustrations and images included in CareNotes® are the copyrighted property of [employer]  or [facility] [employer]   The above information is an  only  It is not intended as medical advice for individual conditions or treatments  Talk to your doctor, nurse or pharmacist before following any medical regimen to see if it is safe and effective for you

## 2022-07-29 NOTE — PROGRESS NOTES
[facility]        NAME: Chris Baca is a 56 y o  male  : 1975    MRN: [id number]  DATE: 2022  TIME: 5:35 PM    Assessment and Plan   Paresthesia of left lower extremity [R20 2]  1  Paresthesia of left lower extremity  predniSONE 10 mg tablet    Ambulatory Referral to Physical Therapy   Discussed referral to neurologist or spine surgeon or PT  He chooses PT      Patient Instructions     Patient Instructions   Use heat 10 - 15 minutes every 2 - 3 hrs especially before stretching, but may use ice for reducing inflammation  Hold any NSAID's like Ibuprofen (Advil), Naprosyn (Aleve), etc while on steroids like Medrol or Prednisone  If you are diabetic you should adhere strictly to your diabetic diet and monitor blood sugar closely while on prednisone and you should discontinue the prednisone if blood sugar becomes significantly elevated  Paresthesia   WHAT YOU NEED TO KNOW:   Paresthesia is numbness, tingling, or burning  It can happen in any part of your body, but usually occurs in your legs, feet, arms, or hands  DISCHARGE INSTRUCTIONS:   Return to the emergency department if:   · You have severe pain along with numbness and tingling  · Your legs suddenly become cold  You have trouble moving your legs, and they ache  · You have increased weakness in a part of your body  · You have uncontrolled movements  Contact your healthcare provider or neurologist if:   · Your symptoms do not improve  · You have symptoms in more than one part of your body  · You have questions or concerns about your condition or care  Manage paresthesia:   · Protect the area from injury  You may injure or burn yourself if you lose feeling in the area  Be careful when you touch anything that could be hot  Wear sturdy shoes to protect your feet  Ask about other ways to protect yourself  · Go to physical or occupational therapy if directed    Your provider may recommend therapy if you have a condition such as carpal tunnel syndrome  A physical therapist can teach you exercises to help strengthen the area or increase your ability to move it  An occupational therapist can help you find new ways to do your daily activities  · Manage health conditions that can cause paresthesia  Work with your diabetes specialist if you have uncontrolled diabetes  A dietitian or your healthcare provider can help you create a meal plan if you have low vitamin B levels  Your provider can help you manage your health if you have multiple sclerosis or you had a stroke  It is important to manage health conditions to stop paresthesia or prevent it from getting worse  Follow up with your healthcare provider or neurologist as directed: Your healthcare provider may refer you to a specialist  Write down your questions so you remember to ask them during your visits  © Copyright [employer] 2022 Information is for End User's use only and may not be sold, redistributed or otherwise used for commercial purposes  All illustrations and images included in CareNotes® are the copyrighted property of [employer]  or [facility]   The above information is an  only  It is not intended as medical advice for individual conditions or treatments  Talk to your doctor, nurse or pharmacist before following any medical regimen to see if it is safe and effective for you  Follow up with PCP in 3-5 days  Proceed to  ER if symptoms worsen  Chief Complaint     Chief Complaint   Patient presents with    foot numbness     C/o intermittent foot numbness and tingling for the past 3 months  More constant the last 2 days  Today got a warm feeling going up to left knee         History of Present Illness       Patient complains of recurrent numbness and tingling in his left 4th and 5th toes for the past 3 months  He denies any trauma  He has history of occasional low back pain but denies any at present    He denies any bowel or bladder symptoms  Review of Systems   Review of Systems   Constitutional: Negative for chills and fever  Respiratory: Negative  Cardiovascular: Negative  Musculoskeletal: Positive for back pain  Skin: Negative for rash  Neurological: Negative for weakness and numbness  Current Medications       Current Outpatient Medications:     loratadine (CLARITIN) 10 mg tablet, Take 10 mg by mouth daily, Disp: , Rfl:     omeprazole (PriLOSEC) 20 mg delayed release capsule, Take 20 mg by mouth daily, Disp: , Rfl:     predniSONE 10 mg tablet, Take once daily all days pills on this schedule 6- 6- 5- 4- 3- 2- 1, Disp: 27 tablet, Rfl: 0    fluticasone (FLONASE) 50 mcg/act nasal spray, 1 spray into each nostril daily, Disp: , Rfl:     predniSONE 10 mg tablet, Take 1 tablet (10 mg total) by mouth daily Take 6 on day 1, take 5 on day 2, take 4 on day 3, take 3 on day 4, take 2 on day 5, take 1 on day 6 , Disp: 21 tablet, Rfl: 0    Current Allergies     Allergies as of 07/29/2022    (No Known Allergies)            The following portions of the patient's history were reviewed and updated as appropriate: allergies, current medications, past family history, past medical history, past social history, past surgical history and problem list      Past Medical History:   Diagnosis Date    Allergic     GERD (gastroesophageal reflux disease)     Lyme disease        Past Surgical History:   Procedure Laterality Date    NO PAST SURGERIES         Family History   Problem Relation Age of Onset    No Known Problems Mother     COPD Father     Diabetes Father     Hyperlipidemia Father     Hypertension Father          Medications have been verified  Objective   /88   Pulse 78   Temp 98 °F (36 7 °C)   Resp 16   Ht 5' 9" (1 753 m)   Wt 75 8 kg (167 lb)   SpO2 99%   BMI 24 66 kg/m²        Physical Exam     Physical Exam  Vitals and nursing note reviewed     Constitutional:       General: He is not in acute distress  Appearance: He is well-developed  Cardiovascular:      Rate and Rhythm: Normal rate and regular rhythm  Pulmonary:      Effort: Pulmonary effort is normal       Breath sounds: Normal breath sounds  Musculoskeletal:         General: No tenderness or deformity  Cervical back: Neck supple  Skin:     General: Skin is warm and dry  Findings: No erythema or rash  Neurological:      Mental Status: He is alert and oriented to person, place, and time  Deep Tendon Reflexes: Reflexes are normal and symmetric  Psychiatric:         Mood and Affect: Mood normal          Behavior: Behavior normal          Thought Content:  Thought content normal          Judgment: Judgment normal

## 2022-08-08 ENCOUNTER — EVALUATION (OUTPATIENT)
Dept: PHYSICAL THERAPY | Facility: CLINIC | Age: 47
End: 2022-08-08
Payer: COMMERCIAL

## 2022-08-08 DIAGNOSIS — R20.2 PARESTHESIA OF LEFT LOWER EXTREMITY: ICD-10-CM

## 2022-08-08 PROCEDURE — 97161 PT EVAL LOW COMPLEX 20 MIN: CPT | Performed by: PHYSICAL THERAPIST

## 2022-08-08 PROCEDURE — 97110 THERAPEUTIC EXERCISES: CPT | Performed by: PHYSICAL THERAPIST

## 2022-08-08 NOTE — PROGRESS NOTES
PT Evaluation     Today's date: 2022  Patient name: Dimitris Root  : 1975  MRN: [id number]  Referring provider: Leta Alfredo MD  Dx:   Encounter Diagnosis     ICD-10-CM    1  Paresthesia of left lower extremity  R20 2 Ambulatory Referral to Physical Therapy       Start Time: 1600  Stop Time: 1655  Total time in clinic (min): 55 minutes    Assessment  Assessment details: Dimitris Root is a pleasant 56 y o  male presenting to PT with cc of acute low back pain and intermittent L foot numbness  Pt states pain began approx 2 mth ago of insidious onset  Upon examination, patient was found to have objective deficits as listed below and is displaying ss consistent with decreased neural mobility  Pt  Is experiencing subsequent functional deficits including pain and difficulty walking, standing, and navigating stairs  Pt  Was educated on role of PT to address issues and given initial treatment with HEP  Pt   Would benefit from skilled physical therapy to promote improved function and maximize activity tolerance    Impairments: abnormal muscle firing, abnormal movement and pain with function  Understanding of Dx/Px/POC: good   Prognosis: good    Goals  Short Term Functional Goals:   In 4 weeks patient will:   - Increase AROM of lumbar spine by/to 50% in all planes to improve donning shoes    - Patient will demonstrate independence and compliance with HEP to maximize improvements in functional mobility    - Patient will demonstrate independence with proper posture, body mechanics, self pain management, and ADL modification    - pt will increase FOTO score by 10pts to reflect a statistically significant improvement       Long Term Functional Goals (Goals for patient at discharge):    In 8 weeks patient will:   - Decrease lumbar pain to 1 on a scale of 0-10 to allow stooping and lifting while working    - Pt will demonstrate safe lifting mechanics with at least a 75lb box to improve work tasks     - Increase AROM of lumbar spine by/to 100% in all planes to improve donning shoes  - Pt will score at or above predicted discharge FOTO score of 85 to reflect improvement in subjective functional ability       Plan  Patient would benefit from: PT eval and skilled physical therapy  Referral necessary: No  Planned modality interventions: cryotherapy and unattended electrical stimulation  Planned therapy interventions: manual therapy, neuromuscular re-education, patient education, therapeutic activities, therapeutic exercise and home exercise program  Frequency: 2x week  Duration in weeks: 6  Plan of Care beginning date: 2022  Plan of Care expiration date: 2022  Treatment plan discussed with: patient        Subjective Evaluation    History of Present Illness  Date of onset: 2022  Mechanism of injury: Reports episodic chronic LBP 2-3 x/yr  Patient complains of recurrent numbness and tingling in his left 4th and 5th toes for the past 3 months  He denies any trauma  He has history of occasional low back pain but denies any at present  He denies any bowel or bladder symptoms  Symp are same after prednisone taper  Lifting increases pain  Driving forklift increases numbness in foot  Stretching daily c some relief  Recurrent probem    Quality of life: good    Pain  Current pain rating: 3  At best pain ratin  At worst pain ratin  Quality: radiating and discomfort  Relieving factors: heat  Aggravating factors: lifting  Progression: no change    Social Support    Employment status: working    Diagnostic Tests  No diagnostic tests performed  Patient Goals  Patient goals for therapy: independence with ADLs/IADLs and return to sport/leisure activities  Patient goal: Resolve numbness in foot           Objective     Concurrent Complaints  Negative for night pain, disturbed sleep, bladder dysfunction, bowel dysfunction and saddle (S4) numbness    Active Range of Motion     Lumbar   Normal active range of motion    Joint Play     Hypermobile: L5     Pain: L5   Mechanical Assessment    Cervical      Thoracic      Lumbar    Standing flexion: repeated movements   Pain location:no change  Standing extension: repeated movements  Pain location: no change    Strength/Myotome Testing     Lumbar   Left   Normal strength  Heel walk: normal  Toe walk: normal    Right   Normal strength  Heel walk: normal  Toe walk: normal    Tests     Lumbar     Left   Negative slump test      Right   Negative slump test      Left Hip   Negative PRABHAKAR  Right Hip   Negative PRABHAKAR  Left Knee   Positive peroneal nerve tension  Right Knee   Negative peroneal nerve tension                Precautions: none     Daily Treatment Diary:      Initial Evaluation Date: 08/08/22  Compliance 8/8                     Visit Number 1                    Re-Eval  IE                 [address]   Foto Captured y                           8/8                     Manual                      L/S STM                      Piriformis TpR                      LAD             L/S Mobs                                   Ther-Ex                      Active Warm-up                      LTR                      Sciatic n  glides 2x10                                           Piriformis Stretch 2x20"            Clamshells                      90/90 stretch                      Supine Bridge 10x5"                     Elio pose                                                                  Neuro Re-Ed                      TrA brace c Uni march                      BKFO             TrA [employer] 3-way                                       Ther-Act                                                               Modalities                      IFC with [facility]

## 2022-08-11 ENCOUNTER — OFFICE VISIT (OUTPATIENT)
Dept: PHYSICAL THERAPY | Facility: CLINIC | Age: 47
End: 2022-08-11
Payer: COMMERCIAL

## 2022-08-11 DIAGNOSIS — R20.2 PARESTHESIA OF LEFT LOWER EXTREMITY: Primary | ICD-10-CM

## 2022-08-11 PROCEDURE — 97112 NEUROMUSCULAR REEDUCATION: CPT | Performed by: PHYSICAL THERAPIST

## 2022-08-11 PROCEDURE — 97140 MANUAL THERAPY 1/> REGIONS: CPT | Performed by: PHYSICAL THERAPIST

## 2022-08-11 PROCEDURE — 97110 THERAPEUTIC EXERCISES: CPT | Performed by: PHYSICAL THERAPIST

## 2022-08-11 NOTE — PROGRESS NOTES
Daily Note     Today's date: 2022  Patient name: Gus Kellogg  : 1975  MRN: [id number]  Referring provider: Rani Winters MD  Dx:   Encounter Diagnosis     ICD-10-CM    1  Paresthesia of left lower extremity  R20 2        Start Time: 1600  Stop Time: 1645  Total time in clinic (min): 45 minutes      Subjective: Pt reports no change since last session  Pain reported as 3/10 at start of session  Objective: See treatment diary below  Assessment:  Gus Kellogg was progressed with PT interventions, focusing on appropriate technique and intensity  They required TC and VC for all exercise perf  Nerve mobs well tolerated and decreased symp by end of session  Pt would benefit from continued skilled PT to resolve remaining functional impairments and facilitate return to PLOF  Plan: Continue per plan of care  Progress treatment as tolerated           Precautions: none     Daily Treatment Diary:      Initial Evaluation Date: 22  Compliance                    Visit Number 1 2                   Re-Eval  IE                 MC   Foto Captured y                                              Manual                      L/S STM   6                   Piriformis TpR                      LAD             L/S Mobs   6                                Ther-Ex                      Active Warm-up                      LTR                      Sciatic n  glides 2x10  2x10  c ADD  2x10                                         Piriformis Stretch 2x20" 6x20"           Clamshells                      90/90 stretch                      Supine Bridge 10x5"  2x10 5" ea                   Elio pose                                                                  Neuro Re-Ed                      TrA brace c Uni march   2x10 (B)                   BKFO  2x10 (B)           TrA Ball Press 3-way  x10 ea                                     Ther-Act Modalities                      IFC with [facility]

## 2022-08-16 ENCOUNTER — OFFICE VISIT (OUTPATIENT)
Dept: PHYSICAL THERAPY | Facility: CLINIC | Age: 47
End: 2022-08-16
Payer: COMMERCIAL

## 2022-08-16 DIAGNOSIS — R20.2 PARESTHESIA OF LEFT LOWER EXTREMITY: Primary | ICD-10-CM

## 2022-08-16 PROCEDURE — 97140 MANUAL THERAPY 1/> REGIONS: CPT | Performed by: PHYSICAL THERAPIST

## 2022-08-16 PROCEDURE — 97112 NEUROMUSCULAR REEDUCATION: CPT | Performed by: PHYSICAL THERAPIST

## 2022-08-16 PROCEDURE — 97110 THERAPEUTIC EXERCISES: CPT | Performed by: PHYSICAL THERAPIST

## 2022-08-16 NOTE — PROGRESS NOTES
Daily Note     Today's date: 2022  Patient name: Ed Cancer  : 1975  MRN: [id number]  Referring provider: Francesca Bonds MD  Dx:   Encounter Diagnosis     ICD-10-CM    1  Paresthesia of left lower extremity  R20 2        Start Time: 1600  Stop Time: 1655  Total time in clinic (min): 55 minutes      Subjective: Pt reports decreased pain following last session  Incorporated new HEP exercises  Was able to tolerate busy day of lifting at work today  Still has tingling in L foot  Pain reported as 1/10 at start of session  Objective: See treatment diary below  Assessment:  Ed Cancer was progressed with PT interventions, focusing on appropriate technique and intensity  They required TC and VC for all exercise perf  TL junction ROM exercises added and well tolerated  Nerve mobs well tolerated and decreased symp by end of session  Pt would benefit from continued skilled PT to resolve remaining functional impairments and facilitate return to PLOF  Plan: Continue per plan of care  Progress treatment as tolerated           Precautions: none     Daily Treatment Diary:      Initial Evaluation Date: 22  Compliance                  Visit Number 1 2  3                 Re-Eval  IE                 MC   Foto Captured y                                            Manual                      L/S STM   6  6                 Piriformis TpR                      LAD             L/S Mobs   6  6                              Ther-Ex                      Active Warm-up                      LTR     x20 (B)                 Sciatic n  glides 2x10  2x10  c ADD  2x10  c ADD  2x10                 S/L Thoracic ROT    x20 (B)                 Piriformis Stretch 2x20" 6x20" 6x20"          Clamshells                      90/90 stretch                      Supine Bridge 10x5"  2x10 5" ea  2x10 5" ea                 Elio pose Neuro Re-Ed                      TrA brace c Uni march   2x10 (B)  2x10 (B)                 BKFO  2x10 (B) 2x10 (B)          TrA Ball Press 3-way  x10 ea x10 5" ea                                    Ther-Act                                                               Modalities                      IFC with P

## 2022-08-18 ENCOUNTER — OFFICE VISIT (OUTPATIENT)
Dept: PHYSICAL THERAPY | Facility: CLINIC | Age: 47
End: 2022-08-18
Payer: COMMERCIAL

## 2022-08-18 DIAGNOSIS — R20.2 PARESTHESIA OF LEFT LOWER EXTREMITY: Primary | ICD-10-CM

## 2022-08-18 PROCEDURE — 97110 THERAPEUTIC EXERCISES: CPT | Performed by: PHYSICAL THERAPIST

## 2022-08-18 PROCEDURE — 97140 MANUAL THERAPY 1/> REGIONS: CPT | Performed by: PHYSICAL THERAPIST

## 2022-08-18 PROCEDURE — 97112 NEUROMUSCULAR REEDUCATION: CPT | Performed by: PHYSICAL THERAPIST

## 2022-08-18 NOTE — PROGRESS NOTES
Daily Note     Today's date: 2022  Patient name: Regla Gorman  : 1975  MRN: [id number]  Referring provider: Jennifer Thompson MD  Dx:   Encounter Diagnosis     ICD-10-CM    1  Paresthesia of left lower extremity  R20 2        Start Time: 1600  Stop Time: 1655  Total time in clinic (min): 55 minutes      Subjective: Pt reports performing  HEP "a lot" yesterday and having increased numbness  Was able to tolerate busy day of lifting at work today  Still has tingling in L foot  Pain reported as 1/10 at start of session  Objective: See treatment diary below  Assessment:  Regla Gorman was progressed with PT interventions, focusing on appropriate technique and intensity  They required TC and VC for all exercise perf  TL junction ROM exercises added and well tolerated  Nerve mobs well tolerated and decreased symp by end of session  Pt would benefit from continued skilled PT to resolve remaining functional impairments and facilitate return to PLOF  Plan: Continue per plan of care  Progress treatment as tolerated           Precautions: none     Daily Treatment Diary:      Initial Evaluation Date: 22  Compliance                Visit Number 1 2  3  4               Re-Eval  IE                 MC   Foto Captured y                                          Manual                      L/S STM   6  6                 Piriformis TpR                      LAD             L/S Mobs   6  6                              Ther-Ex                      Active Warm-up                      LTR     x20 (B)  x20 (B)               Sciatic n  glides 2x10  2x10  c ADD  2x10  c ADD  2x10   c ADD  2x10               S/L Thoracic ROT    x20 (B) x20 (B)               Piriformis Stretch 2x20" 6x20" 6x20" 6x20"         Clamshells                      90/90 stretch                      Supine Bridge 10x5"  2x10 5" ea  2x10 5" ea  2x10 5" ea               Elio pose Neuro Re-Ed                      TrA brace c Uni march   2x10 (B)  2x10 (B)  2x10 (B)               BKFO  2x10 (B) 2x10 (B) 2x10 (B)         TrA Ball Press 3-way  x10 ea x10 5" ea x10 5" ea                                   Ther-Act                                                               Modalities                      IFC with P

## 2022-08-23 ENCOUNTER — APPOINTMENT (OUTPATIENT)
Dept: PHYSICAL THERAPY | Facility: CLINIC | Age: 47
End: 2022-08-23
Payer: COMMERCIAL

## 2022-08-25 ENCOUNTER — APPOINTMENT (OUTPATIENT)
Dept: PHYSICAL THERAPY | Facility: CLINIC | Age: 47
End: 2022-08-25
Payer: COMMERCIAL

## 2022-08-30 ENCOUNTER — APPOINTMENT (OUTPATIENT)
Dept: PHYSICAL THERAPY | Facility: CLINIC | Age: 47
End: 2022-08-30
Payer: COMMERCIAL

## 2022-09-21 NOTE — PROGRESS NOTES
Quick Discharge      Shawnee Gomez has not been seen in [facility] PT clinic related to radicular LBP since 8/18/22  Symp were worsening and he returned to physician  They do not have any further scheduled appts and has not returned calls to schedule  They are D/C from PT at this time

## 2024-02-21 PROBLEM — S61.219A LACERATION WITHOUT FOREIGN BODY OF UNSPECIFIED FINGER WITHOUT DAMAGE TO NAIL, INITIAL ENCOUNTER: Status: RESOLVED | Noted: 2019-06-15 | Resolved: 2024-02-21

## 2024-11-20 ENCOUNTER — OFFICE VISIT (OUTPATIENT)
Dept: URGENT CARE | Facility: CLINIC | Age: 49
End: 2024-11-20
Payer: COMMERCIAL

## 2024-11-20 VITALS
RESPIRATION RATE: 18 BRPM | SYSTOLIC BLOOD PRESSURE: 135 MMHG | WEIGHT: 181.6 LBS | HEART RATE: 62 BPM | OXYGEN SATURATION: 100 % | DIASTOLIC BLOOD PRESSURE: 84 MMHG | HEIGHT: 68 IN | TEMPERATURE: 97 F | BODY MASS INDEX: 27.52 KG/M2

## 2024-11-20 DIAGNOSIS — H81.12 BENIGN PAROXYSMAL POSITIONAL VERTIGO OF LEFT EAR: Primary | ICD-10-CM

## 2024-11-20 PROCEDURE — 99213 OFFICE O/P EST LOW 20 MIN: CPT

## 2024-11-20 RX ORDER — DOXYCYCLINE 100 MG/1
100 TABLET ORAL 2 TIMES DAILY
COMMUNITY

## 2024-11-20 RX ORDER — MECLIZINE HYDROCHLORIDE 25 MG/1
25 TABLET ORAL EVERY 8 HOURS PRN
Qty: 30 TABLET | Refills: 0 | Status: SHIPPED | OUTPATIENT
Start: 2024-11-20

## 2024-11-20 NOTE — PROGRESS NOTES
[facility]        NAME: Crow Nieves is a 49 y.o. male  : 1975    MRN: [id number]  DATE: 2024  TIME: 12:01 PM    Assessment and Plan   Benign paroxysmal positional vertigo of left ear [H81.12]  1. Benign paroxysmal positional vertigo of left ear  meclizine (ANTIVERT) 25 mg tablet    Ambulatory Referral to Physical Therapy        Clinical findings concerning for BPPV to the left.  Will treat with meclizine and referral placed to physical therapy.  Epley maneuver. Encouraged continued supportive measures.  Follow up with PCP in 3-5 days or proceed to emergency department for worsening symptoms.  Patient verbalized understanding of instructions given.       Patient Instructions     Patient Instructions   Epley maneuver  Take medication as prescribed  Referral placed to PT   Follow up with PCP in 3-5 days.  Proceed to  ER if symptoms worsen.    If tests have been performed at [facility], our office will contact you with results if changes need to be made to the care plan discussed with you at the visit.  [facility]      Patient Education     Vertigo (a type of dizziness)   The Basics   Written by the doctors and editors at [facility]   What are dizziness and vertigo? -- Dizziness is a feeling that is sometimes hard to describe. It often makes you feel like you are about to fall or pass out. Dizziness can also cause you to feel lightheaded or make it hard for you to walk straight.  Vertigo is a type of dizziness. It makes you feel like you are spinning, swaying, or tilting, or like the room is moving around you. Depending on the cause, these feelings can come and go, and might last seconds, hours, or days. You might feel worse when you move your head, change positions, cough, or sneeze.  Some people with vertigo have trouble walking. Others have nausea and might vomit.  What causes vertigo? -- The most common causes of vertigo include:   Inner ear  "problems - Deep inside the ear, there is a small network of tubes that are filled with fluid (figure 1). Floating inside that fluid are special calcium deposits. These tubes and deposits are part of the \"vestibular system.\" This system tells the brain what position the body is in and how, and if it is moving or still. It also helps keep you balanced.  Problems that affect the inner ear and can lead to vertigo include:   Benign paroxysmal positional vertigo (\"BPPV\") - In this condition, calcium deposits become dislodged from their location in the inner ear. This can lead to short episodes of vertigo that happen when you move your head in certain ways.   Meniere disease - This is a condition in which fluid builds up inside the inner ear. This causes vertigo, as well as hearing loss and ringing in 1 or both ears.   Vestibular neuritis - This is believed to be caused by a virus that can cause inflammation of the nerve in the inner ear. It is sometimes called \"labyrinthitis.\" People with this condition have vertigo that starts quickly and can last several days. They also often feel very sick and off balance.   Head injury - Even a minor head injury can cause inner ear damage and vertigo. This is usually temporary.   Other problems - Other things that can cause vertigo include:   Vestibular migraine - People who get migraine headaches can sometimes have episodes of vertigo. This can happen with or without a headache.   Certain medicines   Problems that affect the brain, such as stroke or multiple sclerosis  Should I see a doctor or nurse? -- See your doctor or nurse right away if you have vertigo and:   Have a new or severe headache   Have a fever higher than 100.4°F (38°C)   Start to see double, or have trouble seeing clearly   Have trouble speaking or hearing   Have weakness in an arm or leg, or your face droops to 1 side   Cannot walk on your own   Pass out   Have numbness or tingling   Have chest pain   Cannot stop " "vomiting  You should also see your doctor or nurse if you have vertigo that lasts for several minutes or more and you:   Are older than 60   Had a stroke in the past   Are at risk for having a stroke, for example, because you have diabetes or you smoke  If you have dizziness or vertigo that comes and goes but you do not have any of the problems listed above, you should still make an appointment with your doctor or nurse.  Will I need tests? -- Maybe. Your doctor will start by learning about your symptoms and doing an exam. During the exam, they will check:   Your hearing   How you walk and keep your balance   How your eyes work when you watch a moving object, and when your head is turned from side to side  Depending on what your doctor finds during the exam, they might order more tests to better understand your hearing or balance problems. In some cases, the doctor will order an MRI of your brain. An MRI is an imaging test that creates pictures of the inside of your body.  How is vertigo treated? -- If your doctor knows what is causing your vertigo, they will probably try to treat that problem directly. For instance, if you have BPPV, the doctor might try moving your head in a specific way. This can move the calcium deposits that are causing your vertigo.  Your doctor can also give you medicines that might help your vertigo and relieve nausea and vomiting.  If your vertigo does not get better, your doctor might also suggest a treatment called \"balance rehabilitation.\" This treatment teaches you exercises that can help you cope with your vertigo.  Is there anything I can do on my own? -- Yes. You can:   Prevent falls - If you have trouble standing or walking because of vertigo, you are at risk of falling. To lower this risk, make your home as safe as possible. Get rid of loose electrical cords, clutter, and slippery rugs. Also, wear sturdy, non-slip shoes, and make sure that your walkways are clear and well lit.   " "Sit or lie down if you start to feel dizzy. If you start to feel dizzy while driving, pull over right away.   Use a cane or walker to help you balance if needed.   Try to avoid changing positions quickly. When you wake up, sit up first, then get out of bed slowly.  All topics are updated as new evidence becomes available and our peer review process is complete.  This topic retrieved from [employer] on: Mar 15, 2024.  Topic 72993 Version 8.0  Release: 32.2.4 - C32.73  © 2024 UpToDate, Inc. and/or its affiliates. All rights reserved.  figure 1: The vestibular system     Deep inside the ear, there is a small network of tubes that are filled with fluid, called the \"semicircular canals.\" Also deep inside the ear are special calcium deposits, called \"otolith organs.\" These tubes and deposits are part of the \"vestibular system.\" This system tells the brain what position the head is in and if it is moving or still. It also helps keep you balanced, especially when you are standing or walking.  Graphic 68059 Version 11.0  Consumer Information Use and Disclaimer   Disclaimer: This generalized information is a limited summary of diagnosis, treatment, and/or medication information. It is not meant to be comprehensive and should be used as a tool to help the user understand and/or assess potential diagnostic and treatment options. It does NOT include all information about conditions, treatments, medications, side effects, or risks that may apply to a specific patient. It is not intended to be medical advice or a substitute for the medical advice, diagnosis, or treatment of a health care provider based on the health care provider's examination and assessment of a patient's specific and unique circumstances. Patients must speak with a health care provider for complete information about their health, medical questions, and treatment options, including any risks or benefits regarding use of medications. This information does not " endorse any treatments or medications as safe, effective, or approved for treating a specific patient. UpToDate, Inc. and its affiliates disclaim any warranty or liability relating to this information or the use thereof.The use of this information is governed by the Terms of Use, available at https://www.[employer].com/en/know/clinical-effectiveness-terms. 2024© UpToDate, Inc. and its affiliates and/or licensors. All rights reserved.  Copyright   © 2024 UpToDate, Inc. and/or its affiliates. All rights reserved.        Chief Complaint     Chief Complaint   Patient presents with    Dizziness     Dizziness and nausea since this morning          History of Present Illness       49-year-old male with no significant past medical history presents with complaints of dizziness and nausea x 1 day.  Patient reports laying on right side while sleeping and rolling out of bed to left when he noticed dizziness as well as room spinning sensation.  Reports symptoms resolved when head in neutral position but while at work performing job duties noticed increased symptoms.  Denies headache, numbness, tingling, or weakness.  No fever or URI symptoms.  Prior history of vertigo. Symptoms resolve with rest but increase with position changes. No head injury or trauma. No falls.         Review of Systems   Review of Systems   Constitutional:  Negative for chills and fever.   HENT:  Negative for congestion, ear discharge, ear pain, rhinorrhea and sore throat.    Eyes:  Negative for photophobia, discharge and visual disturbance.   Respiratory:  Negative for cough, shortness of breath and wheezing.    Cardiovascular:  Negative for chest pain.   Gastrointestinal:  Positive for nausea. Negative for abdominal pain, diarrhea and vomiting.   Skin:  Negative for rash.   Neurological:  Positive for dizziness. Negative for seizures, syncope, weakness, numbness and headaches.         Current Medications       Current Outpatient Medications:      "doxycycline (ADOXA) 100 MG tablet, Take 100 mg by mouth 2 (two) times a day, Disp: , Rfl:     loratadine (CLARITIN) 10 mg tablet, Take 10 mg by mouth daily, Disp: , Rfl:     meclizine (ANTIVERT) 25 mg tablet, Take 1 tablet (25 mg total) by mouth every 8 (eight) hours as needed for dizziness, Disp: 30 tablet, Rfl: 0    omeprazole (PriLOSEC) 20 mg delayed release capsule, Take 20 mg by mouth daily, Disp: , Rfl:     fluticasone (FLONASE) 50 mcg/act nasal spray, 1 spray into each nostril daily (Patient not taking: Reported on 11/20/2024), Disp: , Rfl:     predniSONE 10 mg tablet, Take 1 tablet (10 mg total) by mouth daily Take 6 on day 1, take 5 on day 2, take 4 on day 3, take 3 on day 4, take 2 on day 5, take 1 on day 6. (Patient not taking: Reported on 11/20/2024), Disp: 21 tablet, Rfl: 0    predniSONE 10 mg tablet, Take once daily all days pills on this schedule 6- 6- 5- 4- 3- 2- 1 (Patient not taking: Reported on 11/20/2024), Disp: 27 tablet, Rfl: 0    Current Allergies     Allergies as of 11/20/2024    (No Known Allergies)            The following portions of the patient's history were reviewed and updated as appropriate: allergies, current medications, past family history, past medical history, past social history, past surgical history and problem list.     Past Medical History:   Diagnosis Date    Allergic     GERD (gastroesophageal reflux disease)     Lyme disease        Past Surgical History:   Procedure Laterality Date    NO PAST SURGERIES         Family History   Problem Relation Age of Onset    No Known Problems Mother     COPD Father     Diabetes Father     Hyperlipidemia Father     Hypertension Father          Medications have been verified.        Objective   /84   Pulse 62   Temp (!) 97 °F (36.1 °C) (Temporal)   Resp 18   Ht 5' 8\" (1.727 m)   Wt 82.4 kg (181 lb 9.6 oz)   SpO2 100%   BMI 27.61 kg/m²   No LMP for male patient.       Physical Exam     Physical Exam  Vitals and nursing note " reviewed.   Constitutional:       General: He is not in acute distress.     Appearance: He is not toxic-appearing.   HENT:      Head: Normocephalic.      Right Ear: Tympanic membrane, ear canal and external ear normal.      Left Ear: Tympanic membrane, ear canal and external ear normal.      Nose: Nose normal.      Mouth/Throat:      Mouth: Mucous membranes are moist.      Pharynx: Oropharynx is clear.   Eyes:      Extraocular Movements: Extraocular movements intact.      Pupils: Pupils are equal, round, and reactive to light.      Comments: Nystagmus    Cardiovascular:      Rate and Rhythm: Normal rate and regular rhythm.      Heart sounds: Normal heart sounds.   Pulmonary:      Effort: Pulmonary effort is normal. No respiratory distress.      Breath sounds: Normal breath sounds. No stridor. No wheezing, rhonchi or rales.   Lymphadenopathy:      Cervical: No cervical adenopathy.   Skin:     General: Skin is warm and dry.   Neurological:      Mental Status: He is alert and oriented to person, place, and time.      GCS: GCS eye subscore is 4. GCS verbal subscore is 5. GCS motor subscore is 6.      Sensory: Sensation is intact.      Motor: Motor function is intact.      Gait: Gait is intact.      Comments: Yary-Hallpike maneuver positive to left    Psychiatric:         Mood and Affect: Mood normal.         Behavior: Behavior normal.

## 2024-11-20 NOTE — LETTER
November 20, 2024     Patient: Crow Nieves   YOB: 1975   Date of Visit: 11/20/2024       To Whom it May Concern:    Crow Nieves was seen in my clinic on 11/20/2024. He may return to work on 11/22/2024 .    If you have any questions or concerns, please don't hesitate to call.         Sincerely,          EMELYN Bernal        CC: No Recipients

## 2024-11-20 NOTE — PATIENT INSTRUCTIONS
"Epley maneuver  Take medication as prescribed  Referral placed to PT   Follow up with PCP in 3-5 days.  Proceed to  ER if symptoms worsen.    If tests have been performed at [facility], our office will contact you with results if changes need to be made to the care plan discussed with you at the visit.  You can review your full results on [facility] MyChart.      Patient Education     Vertigo (a type of dizziness)   The Basics   Written by the doctors and editors at [facility]   What are dizziness and vertigo? -- Dizziness is a feeling that is sometimes hard to describe. It often makes you feel like you are about to fall or pass out. Dizziness can also cause you to feel lightheaded or make it hard for you to walk straight.  Vertigo is a type of dizziness. It makes you feel like you are spinning, swaying, or tilting, or like the room is moving around you. Depending on the cause, these feelings can come and go, and might last seconds, hours, or days. You might feel worse when you move your head, change positions, cough, or sneeze.  Some people with vertigo have trouble walking. Others have nausea and might vomit.  What causes vertigo? -- The most common causes of vertigo include:   Inner ear problems - Deep inside the ear, there is a small network of tubes that are filled with fluid (figure 1). Floating inside that fluid are special calcium deposits. These tubes and deposits are part of the \"vestibular system.\" This system tells the brain what position the body is in and how, and if it is moving or still. It also helps keep you balanced.  Problems that affect the inner ear and can lead to vertigo include:   Benign paroxysmal positional vertigo (\"BPPV\") - In this condition, calcium deposits become dislodged from their location in the inner ear. This can lead to short episodes of vertigo that happen when you move your head in certain ways.   Meniere disease - This is a condition in which fluid builds up inside the inner " "ear. This causes vertigo, as well as hearing loss and ringing in 1 or both ears.   Vestibular neuritis - This is believed to be caused by a virus that can cause inflammation of the nerve in the inner ear. It is sometimes called \"labyrinthitis.\" People with this condition have vertigo that starts quickly and can last several days. They also often feel very sick and off balance.   Head injury - Even a minor head injury can cause inner ear damage and vertigo. This is usually temporary.   Other problems - Other things that can cause vertigo include:   Vestibular migraine - People who get migraine headaches can sometimes have episodes of vertigo. This can happen with or without a headache.   Certain medicines   Problems that affect the brain, such as stroke or multiple sclerosis  Should I see a doctor or nurse? -- See your doctor or nurse right away if you have vertigo and:   Have a new or severe headache   Have a fever higher than 100.4°F (38°C)   Start to see double, or have trouble seeing clearly   Have trouble speaking or hearing   Have weakness in an arm or leg, or your face droops to 1 side   Cannot walk on your own   Pass out   Have numbness or tingling   Have chest pain   Cannot stop vomiting  You should also see your doctor or nurse if you have vertigo that lasts for several minutes or more and you:   Are older than 60   Had a stroke in the past   Are at risk for having a stroke, for example, because you have diabetes or you smoke  If you have dizziness or vertigo that comes and goes but you do not have any of the problems listed above, you should still make an appointment with your doctor or nurse.  Will I need tests? -- Maybe. Your doctor will start by learning about your symptoms and doing an exam. During the exam, they will check:   Your hearing   How you walk and keep your balance   How your eyes work when you watch a moving object, and when your head is turned from side to side  Depending on what your " "doctor finds during the exam, they might order more tests to better understand your hearing or balance problems. In some cases, the doctor will order an MRI of your brain. An MRI is an imaging test that creates pictures of the inside of your body.  How is vertigo treated? -- If your doctor knows what is causing your vertigo, they will probably try to treat that problem directly. For instance, if you have BPPV, the doctor might try moving your head in a specific way. This can move the calcium deposits that are causing your vertigo.  Your doctor can also give you medicines that might help your vertigo and relieve nausea and vomiting.  If your vertigo does not get better, your doctor might also suggest a treatment called \"balance rehabilitation.\" This treatment teaches you exercises that can help you cope with your vertigo.  Is there anything I can do on my own? -- Yes. You can:   Prevent falls - If you have trouble standing or walking because of vertigo, you are at risk of falling. To lower this risk, make your home as safe as possible. Get rid of loose electrical cords, clutter, and slippery rugs. Also, wear sturdy, non-slip shoes, and make sure that your walkways are clear and well lit.   Sit or lie down if you start to feel dizzy. If you start to feel dizzy while driving, pull over right away.   Use a cane or walker to help you balance if needed.   Try to avoid changing positions quickly. When you wake up, sit up first, then get out of bed slowly.  All topics are updated as new evidence becomes available and our peer review process is complete.  This topic retrieved from [facility] on: Mar 15, 2024.  Topic 55542 Version 8.0  Release: 32.2.4 - C32.73  © 2024 [employer] and/or its affiliates. All rights reserved.  figure 1: The vestibular system     Deep inside the ear, there is a small network of tubes that are filled with fluid, called the \"semicircular canals.\" Also deep inside the ear are special calcium " "deposits, called \"otolith organs.\" These tubes and deposits are part of the \"vestibular system.\" This system tells the brain what position the head is in and if it is moving or still. It also helps keep you balanced, especially when you are standing or walking.  Graphic 81177 Version 11.0  Consumer Information Use and Disclaimer   Disclaimer: This generalized information is a limited summary of diagnosis, treatment, and/or medication information. It is not meant to be comprehensive and should be used as a tool to help the user understand and/or assess potential diagnostic and treatment options. It does NOT include all information about conditions, treatments, medications, side effects, or risks that may apply to a specific patient. It is not intended to be medical advice or a substitute for the medical advice, diagnosis, or treatment of a health care provider based on the health care provider's examination and assessment of a patient's specific and unique circumstances. Patients must speak with a health care provider for complete information about their health, medical questions, and treatment options, including any risks or benefits regarding use of medications. This information does not endorse any treatments or medications as safe, effective, or approved for treating a specific patient. [employer], Inc[employer] and its affiliates disclaim any warranty or liability relating to this information or the use thereof.The use of this information is governed by the Terms of Use, available at https://www.[employer] Networkuwer.com/en/know/clinical-effectiveness-terms. 2024© [employer]Date, Inc. and its affiliates and/or licensors. All rights reserved.  Copyright   © 2024 [employer] and/or its affiliates. All rights reserved.    "

## 2025-04-11 ENCOUNTER — HOSPITAL ENCOUNTER (OUTPATIENT)
Dept: MRI IMAGING | Facility: HOSPITAL | Age: 50
End: 2025-04-11
Payer: COMMERCIAL

## 2025-04-11 DIAGNOSIS — G57.62 LESION OF LEFT PLANTAR NERVE: ICD-10-CM

## 2025-04-11 DIAGNOSIS — M21.6X2 OTHER ACQUIRED DEFORMITIES OF LEFT FOOT: ICD-10-CM

## 2025-04-11 PROCEDURE — A9585 GADOBUTROL INJECTION: HCPCS | Performed by: STUDENT IN AN ORGANIZED HEALTH CARE EDUCATION/TRAINING PROGRAM

## 2025-04-11 PROCEDURE — 73720 MRI LWR EXTREMITY W/O&W/DYE: CPT

## 2025-04-11 RX ORDER — GADOBUTROL 604.72 MG/ML
8 INJECTION INTRAVENOUS
Status: COMPLETED | OUTPATIENT
Start: 2025-04-11 | End: 2025-04-11

## 2025-04-11 RX ADMIN — GADOBUTROL 8 ML: 604.72 INJECTION INTRAVENOUS at 09:41
